# Patient Record
Sex: MALE | Race: AMERICAN INDIAN OR ALASKA NATIVE
[De-identification: names, ages, dates, MRNs, and addresses within clinical notes are randomized per-mention and may not be internally consistent; named-entity substitution may affect disease eponyms.]

---

## 2022-01-28 ENCOUNTER — HOSPITAL ENCOUNTER (EMERGENCY)
Dept: HOSPITAL 5 - ED | Age: 47
Discharge: HOME | End: 2022-01-28
Payer: MEDICARE

## 2022-01-28 VITALS — SYSTOLIC BLOOD PRESSURE: 151 MMHG | DIASTOLIC BLOOD PRESSURE: 90 MMHG

## 2022-01-28 DIAGNOSIS — J06.9: ICD-10-CM

## 2022-01-28 DIAGNOSIS — F17.200: ICD-10-CM

## 2022-01-28 DIAGNOSIS — E11.65: ICD-10-CM

## 2022-01-28 DIAGNOSIS — I10: ICD-10-CM

## 2022-01-28 DIAGNOSIS — J44.1: Primary | ICD-10-CM

## 2022-01-28 DIAGNOSIS — E86.0: ICD-10-CM

## 2022-01-28 LAB
ALBUMIN SERPL-MCNC: 4.6 G/DL (ref 3.9–5)
ALT SERPL-CCNC: 23 UNITS/L (ref 7–56)
APTT BLD: 29.5 SEC. (ref 24.2–36.6)
BASOPHILS # (AUTO): 0 K/MM3 (ref 0–0.1)
BASOPHILS NFR BLD AUTO: 0.2 % (ref 0–1.8)
BUN SERPL-MCNC: 9 MG/DL (ref 9–20)
BUN/CREAT SERPL: 10 %
CALCIUM SERPL-MCNC: 10 MG/DL (ref 8.4–10.2)
EOSINOPHIL # BLD AUTO: 0.1 K/MM3 (ref 0–0.4)
EOSINOPHIL NFR BLD AUTO: 1 % (ref 0–4.3)
HCT VFR BLD CALC: 42.7 % (ref 35.5–45.6)
HEMOLYSIS INDEX: 1
HGB BLD-MCNC: 13.6 GM/DL (ref 11.8–15.2)
INR PPP: 0.84 (ref 0.87–1.13)
LYMPHOCYTES # BLD AUTO: 1.6 K/MM3 (ref 1.2–5.4)
LYMPHOCYTES NFR BLD AUTO: 15.4 % (ref 13.4–35)
MCHC RBC AUTO-ENTMCNC: 32 % (ref 32–34)
MCV RBC AUTO: 90 FL (ref 84–94)
MONOCYTES # (AUTO): 0.7 K/MM3 (ref 0–0.8)
MONOCYTES % (AUTO): 7.2 % (ref 0–7.3)
PLATELET # BLD: 337 K/MM3 (ref 140–440)
RBC # BLD AUTO: 4.77 M/MM3 (ref 3.65–5.03)

## 2022-01-28 PROCEDURE — 36415 COLL VENOUS BLD VENIPUNCTURE: CPT

## 2022-01-28 PROCEDURE — 83735 ASSAY OF MAGNESIUM: CPT

## 2022-01-28 PROCEDURE — 71046 X-RAY EXAM CHEST 2 VIEWS: CPT

## 2022-01-28 PROCEDURE — 99284 EMERGENCY DEPT VISIT MOD MDM: CPT

## 2022-01-28 PROCEDURE — 96360 HYDRATION IV INFUSION INIT: CPT

## 2022-01-28 PROCEDURE — 85025 COMPLETE CBC W/AUTO DIFF WBC: CPT

## 2022-01-28 PROCEDURE — 84484 ASSAY OF TROPONIN QUANT: CPT

## 2022-01-28 PROCEDURE — 71275 CT ANGIOGRAPHY CHEST: CPT

## 2022-01-28 PROCEDURE — 93010 ELECTROCARDIOGRAM REPORT: CPT

## 2022-01-28 PROCEDURE — 85379 FIBRIN DEGRADATION QUANT: CPT

## 2022-01-28 PROCEDURE — 94640 AIRWAY INHALATION TREATMENT: CPT

## 2022-01-28 PROCEDURE — 85610 PROTHROMBIN TIME: CPT

## 2022-01-28 PROCEDURE — 84443 ASSAY THYROID STIM HORMONE: CPT

## 2022-01-28 PROCEDURE — 93005 ELECTROCARDIOGRAM TRACING: CPT

## 2022-01-28 PROCEDURE — 83880 ASSAY OF NATRIURETIC PEPTIDE: CPT

## 2022-01-28 PROCEDURE — 80053 COMPREHEN METABOLIC PANEL: CPT

## 2022-01-28 PROCEDURE — 85730 THROMBOPLASTIN TIME PARTIAL: CPT

## 2022-01-28 PROCEDURE — 94644 CONT INHLJ TX 1ST HOUR: CPT

## 2022-01-28 NOTE — XRAY REPORT
CHEST 2 VIEWS 



INDICATION:  Chest Pain.



COMPARISON:  none



FINDINGS:

Support devices: None.



Heart: Within normal limits. 

Lungs/pleura: No acute air space or interstitial disease.  No pneumothorax.



Additional findings: None.



IMPRESSION:

Unremarkable chest films.



Signer Name: Winston Manzo Jr, MD 

Signed: 1/28/2022 8:40 AM

Workstation Name: [id number]

## 2022-01-28 NOTE — EMERGENCY DEPARTMENT REPORT
HPI





- General


Chief Complaint: Chest Pain


Time Seen by Provider: 01/28/22 07:50





- HPI


HPI: 





46-year-old male with history of hypertension, CAD, DM 2, and COPD presents 

complaining of 3 days of chest and respiratory symptoms which he attributes to 

his COPD.  He describes progressive development of chest congestion/tightness, 

dry cough, and shortness of breath.  He states that he has a nebulizer at home 

but has been out of albuterol.  He states he was able to borrow some albuterol 

from a friend to give him a couple of treatments but does not have enough.  His 

last treatment was just prior to arrival today.  He also says he has had 

increased swelling in his bilateral lower extremities.  He denies any associated

fever/chills, headache, vision change, neck pain, abdominal pain, 

nausea/vomiting, focal weakness, sensory changes, or any other complaints.  No 

known sick contacts.  There are no known aggravating or alleviating factors.  He

is fully vaccinated against COVID-19 and has received a booster.  He is an 

active smoker.





ED Past Medical Hx





- Past Medical History


Hx Hypertension: Yes


Hx Heart Attack/AMI: Yes


Hx Diabetes: Yes


Hx COPD: Yes





- Surgical History


Past Surgical History?: No


Additional Surgical History: foot surgery.  hand surgery





- Social History


Smoking Status: Current Every Day Smoker





- Medications


Home Medications: 


                                Home Medications











 Medication  Instructions  Recorded  Confirmed  Last Taken  Type


 


Albuterol Sulfate [Albuterol 0.63% 0.63 mg IH TID PRN #30 vial 01/28/22  Unknown

 Rx





NEBS]     


 


Albuterol Sulfate [Proventil Hfa] 6.7 gm IH Q6H PRN #1 inh 01/28/22  Unknown Rx














ED Review of Systems


ROS: 


Stated complaint: BRONCHITIS


Other details as noted in HPI





Comment: All other systems reviewed and negative


Constitutional: denies: chills, fever


Eyes: denies: eye pain, vision change


ENT: denies: throat pain, congestion


Respiratory: cough, shortness of breath


Cardiovascular: chest pain, edema.  denies: palpitations, syncope


Endocrine: other (dry mouth)


Gastrointestinal: denies: abdominal pain, nausea, vomiting


Musculoskeletal: denies: back pain, arthralgia


Skin: denies: rash, lesions


Neurological: denies: headache, weakness, numbness





Physical Exam





- Physical Exam


Vital Signs: 


                                   Vital Signs











  01/28/22





  06:52


 


Temperature 98.9 F


 


Pulse Rate 102 H


 


Respiratory 22





Rate 


 


Blood Pressure 147/92


 


O2 Sat by Pulse 99





Oximetry 











Physical Exam: 





GENERAL: Well developed and well nourished. No acute distress


HEAD: Normocephalic. No obvious signs of trauma. 


ENT: Dry mucous membranes.


EYES: Extraocular movements are intact. Pupils are equal round and reactive to 

light bilaterally


NECK: Supple. Full ROM is intact. Trachea is midline.


LUNGS: Tachypneic but not in respiratory distress.  Equal chest rise bilate

rally.  Scattered coarse breath sounds bilaterally but no wheezes or rales 

appreciated.


CARDIOVASCULAR: Slightly tachycardic but with regular rhythm. No murmurs or 

rubs.


VASCULAR: Cap refill < 2 seconds.  2+ pitting edema of the bilateral lower 

extremities


ABDOMEN: Abdomen is soft and nondistended. There is no significant tenderness, 

guarding or rebound.


SKIN: Skin is warm and dry


NEURO: Patient is awake, alert, and oriented. CNs II-XII grossly intact. No 

focal deficits. Normal motor and sensory exam throughout. Normal speech.  


MUSCULOSKELETAL: No obvious deformities. No significant tenderness. Normal ROM 

throughout. 


BACK/SPINE:  No costovertebral angle tenderness.





ED Course


                                   Vital Signs











  01/28/22





  06:52


 


Temperature 98.9 F


 


Pulse Rate 102 H


 


Respiratory 22





Rate 


 


Blood Pressure 147/92


 


O2 Sat by Pulse 99





Oximetry 














ED Medical Decision Making





- Lab Data


Result diagrams: 


                                 01/28/22 08:35





                                 01/28/22 08:35





                                   Lab Results











  01/28/22 01/28/22 01/28/22 Range/Units





  08:35 08:35 08:35 


 


WBC  10.1    (4.5-11.0)  K/mm3


 


RBC  4.77    (3.65-5.03)  M/mm3


 


Hgb  13.6    (11.8-15.2)  gm/dl


 


Hct  42.7    (35.5-45.6)  %


 


MCV  90    (84-94)  fl


 


MCH  29    (28-32)  pg


 


MCHC  32    (32-34)  %


 


RDW  14.7    (13.2-15.2)  %


 


Plt Count  337    (140-440)  K/mm3


 


Lymph % (Auto)  15.4    (13.4-35.0)  %


 


Mono % (Auto)  7.2    (0.0-7.3)  %


 


Eos % (Auto)  1.0    (0.0-4.3)  %


 


Baso % (Auto)  0.2    (0.0-1.8)  %


 


Lymph # (Auto)  1.6    (1.2-5.4)  K/mm3


 


Mono # (Auto)  0.7    (0.0-0.8)  K/mm3


 


Eos # (Auto)  0.1    (0.0-0.4)  K/mm3


 


Baso # (Auto)  0.0    (0.0-0.1)  K/mm3


 


Seg Neutrophils %  76.2 H    (40.0-70.0)  %


 


Seg Neutrophils #  7.7    (1.8-7.7)  K/mm3


 


PT   12.5   (12.2-14.9)  Sec.


 


INR   0.84 L   (0.87-1.13)  


 


APTT   29.5   (24.2-36.6)  Sec.


 


D-Dimer   414.07 H   (0-234)  ng/mlDDU


 


Sodium    137  (137-145)  mmol/L


 


Potassium    4.1  (3.6-5.0)  mmol/L


 


Chloride    101.2  ()  mmol/L


 


Carbon Dioxide    25  (22-30)  mmol/L


 


Anion Gap    15  mmol/L


 


BUN    9  (9-20)  mg/dL


 


Creatinine    0.9  (0.8-1.3)  mg/dL


 


Estimated GFR    > 60  ml/min


 


BUN/Creatinine Ratio    10  %


 


Glucose    293 H  ()  mg/dL


 


Calcium    10.0  (8.4-10.2)  mg/dL


 


Magnesium     (1.7-2.3)  mg/dL


 


Total Bilirubin    0.50  (0.1-1.2)  mg/dL


 


AST    15  (5-40)  units/L


 


ALT    23  (7-56)  units/L


 


Alkaline Phosphatase    107  ()  units/L


 


Troponin T    < 0.010  (0.00-0.029)  ng/mL


 


NT-Pro-B Natriuret Pep     (0-450)  pg/mL


 


Total Protein    7.9  (6.3-8.2)  g/dL


 


Albumin    4.6  (3.9-5)  g/dL


 


Albumin/Globulin Ratio    1.4  %


 


TSH     (0.270-4.200)  mlU/mL














  01/28/22 01/28/22 01/28/22 Range/Units





  08:35 08:35 11:50 


 


WBC     (4.5-11.0)  K/mm3


 


RBC     (3.65-5.03)  M/mm3


 


Hgb     (11.8-15.2)  gm/dl


 


Hct     (35.5-45.6)  %


 


MCV     (84-94)  fl


 


MCH     (28-32)  pg


 


MCHC     (32-34)  %


 


RDW     (13.2-15.2)  %


 


Plt Count     (140-440)  K/mm3


 


Lymph % (Auto)     (13.4-35.0)  %


 


Mono % (Auto)     (0.0-7.3)  %


 


Eos % (Auto)     (0.0-4.3)  %


 


Baso % (Auto)     (0.0-1.8)  %


 


Lymph # (Auto)     (1.2-5.4)  K/mm3


 


Mono # (Auto)     (0.0-0.8)  K/mm3


 


Eos # (Auto)     (0.0-0.4)  K/mm3


 


Baso # (Auto)     (0.0-0.1)  K/mm3


 


Seg Neutrophils %     (40.0-70.0)  %


 


Seg Neutrophils #     (1.8-7.7)  K/mm3


 


PT     (12.2-14.9)  Sec.


 


INR     (0.87-1.13)  


 


APTT     (24.2-36.6)  Sec.


 


D-Dimer     (0-234)  ng/mlDDU


 


Sodium     (137-145)  mmol/L


 


Potassium     (3.6-5.0)  mmol/L


 


Chloride     ()  mmol/L


 


Carbon Dioxide     (22-30)  mmol/L


 


Anion Gap     mmol/L


 


BUN     (9-20)  mg/dL


 


Creatinine     (0.8-1.3)  mg/dL


 


Estimated GFR     ml/min


 


BUN/Creatinine Ratio     %


 


Glucose     ()  mg/dL


 


Calcium     (8.4-10.2)  mg/dL


 


Magnesium  1.90    (1.7-2.3)  mg/dL


 


Total Bilirubin     (0.1-1.2)  mg/dL


 


AST     (5-40)  units/L


 


ALT     (7-56)  units/L


 


Alkaline Phosphatase     ()  units/L


 


Troponin T    < 0.010  (0.00-0.029)  ng/mL


 


NT-Pro-B Natriuret Pep  21.66    (0-450)  pg/mL


 


Total Protein     (6.3-8.2)  g/dL


 


Albumin     (3.9-5)  g/dL


 


Albumin/Globulin Ratio     %


 


TSH   2.170   (0.270-4.200)  mlU/mL














- EKG Data


-: EKG Interpreted by Me





- EKG Data





01/28/22 09:17


Normal sinus rhythm.  Normal axis.  Normal intervals.  There are Q waves noted 

in the inferior and lateral leads.  Nonspecific T wave inversions.  There are no

significant ST segment abnormalities.





- Radiology Data


Radiology results: report reviewed





- Medical Decision Making





46-year-old male with history of CAD, diabetes, and COPD presenting with 3 days 

of chest tightness/congestion, dry cough, shortness of breath.  Patient states 

that he has a nebulizer at home but has been out of his albuterol.  He is used 

couple doses of albuterol 3 borrowed from a friend.  He is afebrile and with 

normal vital signs with the exception of elevated heart rate.  On physical 

examination, he has dry mucous membranes.  He is tachypneic but not in res

piratory distress.  He has scattered coarse breath sounds but no rales or 

wheezes appreciated.  He has 2+ pitting edema the bilateral lower extremities.  

Although it is possible that the patient's symptoms represent an exacerbation of

his COPD, given the chest tightness, his history of CAD, and the lack of 

wheezing heard on exam, we will perform more broad work-up with a full set of 

labs to include D-dimer given that his Wells PE score is 1.5, corresponding to 

low risk.  We will obtain EKG, chest x-ray.  We will give gentle fluids, full-

strength aspirin, and duo nebs.  We will hold off on steroids at this point but 

if the patient develops wheezing or other signs of COPD exacerbation we will 

give steroids at that time





Chest x-ray reveals no acute abnormalities.





Labs have resulted and reveal no significant leukocytosis or anemia.  Creatinine

is within normal range and there are no significant electrolyte abnormalities.  

Troponin is negative.  BNP is within normal limits.  Glucose is elevated at 293.

 We will give 1 L of IV fluids





On repeat assessment after albuterol, the patient's respiratory status is 

unchanged.  Lung auscultation reveals only scattered coarse breath sounds.  We 

will continue to observe





Troponin is negative x2.





D-dimer is positive.  CTA of the chest has been ordered to assess for evidence 

of pulmonary embolism.





CTA of the chest reveals no acute abnormalities and no signs of pulmonary 

embolism.  On repeat assessment at this time, the patient remains without any 

wheezing or prolonged expiratory phase.  I discussed with the patient the likely

diagnosis of URI as well as the fact that he has elevated blood sugar and 

dehydration.  He admits to me that he has not been taking his prescribed 

diabetes pills regularly.  I discussed with him the importance of taking these 

medications to keep his blood sugar within control and he expressed 

understanding and agreement.  In addition, we discussed options moving forward 

given that he feels he may be having a COPD exacerbation.  Because of the 

tendency of steroids to cause hyperglycemia we will give 1 dose of Decadron now 

and discharge the patient with refills for his albuterol nebulizer and an 

albuterol MDI.  He will follow up with a primary care doctor for closer 

monitoring of his blood sugars.  He will drink plenty of fluids and return to 

the emergency department for any worsening symptoms or new health concerns.


Critical care attestation.: 


If time is entered above; I have spent that time in minutes in the direct care 

of this critically ill patient, excluding procedure time.








ED Disposition


Clinical Impression: 


 COPD exacerbation, URI (upper respiratory infection), Hyperglycemia, 

Dehydration





Disposition: 01 HOME / SELF CARE / HOMELESS


Is pt being admited?: No


Condition: Stable


Instructions:  Chronic Obstructive Pulmonary Disease Exacerbation, Upper 

Respiratory Infection, Adult, Hyperglycemia, Blood Glucose Monitoring, Adult, 

Chronic Obstructive Pulmonary Disease (ED)


Additional Instructions: 


Please take your diabetes medication exactly as prescribed.  Drink plenty of 

fluids and use your albuterol nebulizer 3 times daily for the next 2 days and 

then as needed.  You should follow-up with a primary care doctor within the next

few days to discuss proper management of your elevated blood sugar.  Return to 

the emergency department for any worsening symptoms, significant chest pain, or 

any other new health concerns.


Prescriptions: 


Albuterol Sulfate [Albuterol 0.63% NEBS] 0.63 mg IH TID PRN #30 vial


 PRN Reason: Wheezing


Albuterol Sulfate [Proventil Hfa] 6.7 gm IH Q6H PRN #1 inh


 PRN Reason: Wheezing


Referrals: 


[facility] [Provider Group] - 2-3 Days





HEART Score





- HEART Score


History: Slightly suspicious


EKG: Non-specific


Age: 45-65


Risk factors: 1-2 risk factors


Troponin: < normal limit


HEART Score: 3

## 2022-01-28 NOTE — CAT SCAN REPORT
CT angio chest



INDICATION / CLINICAL INFORMATION:

cough, SOB, positive d-dimer, assess for PE OMNI 350 100ML.



TECHNIQUE:

Axial CT images were obtained through the chest after injection of IV contrast. 3 plane MIP and/or 3D
 reconstructions were produced. All CT scans at this location are performed using CT dose reduction f
or ALARA by means of automated exposure control. 



COMPARISON:

X-ray from same day



FINDINGS:

PULMONARY ARTERIES: No pulmonary emboli.

HEART: No significant abnormality.

MEDIASTINUM / YUMIKO: No significant abnormality.

LUNGS: Lungs are clear No pleural effusion.  No pneumothorax. 



ADDITIONAL FINDINGS: None.



UPPER ABDOMEN: No acute findings.



SKELETAL STRUCTURES: No significant osseous abnormality.



IMPRESSION:

1. No CT evidence for pulmonary embolism. 

2. No acute findings.

 



Signer Name: Benitez Henriquez MD 

Signed: 1/28/2022 1:52 PM

Workstation Name: [id number]

## 2022-01-29 NOTE — ELECTROCARDIOGRAPH REPORT
[facility]

                                       

Test Date:    2022               Test Time:    06:55:49

Pat Name:     KIRSTEN YU         Department:   

Patient ID:   [id number]          Room:          

Gender:       M                        Technician:   

:          [id number]               Requested By: GIOVANA MILTON

Order Number: [id number]              Reading MD:   Carol Ann Paris

                                 Measurements

Intervals                              Axis          

Rate:         98                       P:            73

NE:           152                      QRS:          77

QRSD:         110                      T:            61

QT:           370                                    

QTc:          471                                    

                           Interpretive Statements

Sinus rhythm

Probable left atrial enlargement

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

Probable anterolateral infarct, old

No previous ECG available for comparison

Electronically Signed On 2022 11:41:02 EST by Carol Ann Paris

## 2022-04-18 ENCOUNTER — HOSPITAL ENCOUNTER (EMERGENCY)
Dept: HOSPITAL 5 - ED | Age: 47
Discharge: LEFT BEFORE DISCHARGE | End: 2022-04-18
Payer: MEDICARE

## 2022-04-18 DIAGNOSIS — M25.559: Primary | ICD-10-CM

## 2022-04-18 DIAGNOSIS — Z53.21: ICD-10-CM
